# Patient Record
Sex: FEMALE | Race: OTHER
[De-identification: names, ages, dates, MRNs, and addresses within clinical notes are randomized per-mention and may not be internally consistent; named-entity substitution may affect disease eponyms.]

---

## 2018-07-30 PROBLEM — Z00.00 ENCOUNTER FOR PREVENTIVE HEALTH EXAMINATION: Status: ACTIVE | Noted: 2018-07-30

## 2018-08-09 ENCOUNTER — APPOINTMENT (OUTPATIENT)
Dept: OBGYN | Facility: CLINIC | Age: 43
End: 2018-08-09
Payer: COMMERCIAL

## 2018-08-09 PROCEDURE — 36415 COLL VENOUS BLD VENIPUNCTURE: CPT

## 2018-08-09 PROCEDURE — 99203 OFFICE O/P NEW LOW 30 MIN: CPT

## 2024-02-06 ENCOUNTER — APPOINTMENT (OUTPATIENT)
Dept: OBGYN | Facility: CLINIC | Age: 49
End: 2024-02-06
Payer: COMMERCIAL

## 2024-02-06 VITALS
SYSTOLIC BLOOD PRESSURE: 104 MMHG | HEART RATE: 82 BPM | BODY MASS INDEX: 28.32 KG/M2 | WEIGHT: 150 LBS | HEIGHT: 61 IN | DIASTOLIC BLOOD PRESSURE: 72 MMHG

## 2024-02-06 DIAGNOSIS — Z01.419 ENCOUNTER FOR GYNECOLOGICAL EXAMINATION (GENERAL) (ROUTINE) W/OUT ABNORMAL FINDINGS: ICD-10-CM

## 2024-02-06 PROCEDURE — 99386 PREV VISIT NEW AGE 40-64: CPT

## 2024-02-06 NOTE — END OF VISIT
[FreeTextEntry3] : I, Molly Darryl, acted as a scribe on behalf of Dr. Myra Mena D.O. on 02/06/2024.  All medical entries made by the scribe were at my, Dr. Myra Mena D.O, direction and personally dictated by me on 02/06/2024. I have reviewed the chart and agree that the record accurately reflects my personal performance of the history, physical exam, assessment and plan. I have also personally directed, reviewed, and agreed with the chart.

## 2024-02-06 NOTE — HISTORY OF PRESENT ILLNESS
[FreeTextEntry1] : 48 year old post menopausal female presents to establish care for an annual. C/o vaginal dryness and bloating.  OBHx:  x2 (, ) PSHx: cholecystectomy (Aug 1994)  Current Meds; Vit D, B12 NKDA  FamHx: DM - father / HTN - father / HLD - father / heart disease - father [Mammogramdate] : 02/23 [PapSmeardate] : 2023 [ColonoscopyDate] : 2019

## 2024-02-06 NOTE — PLAN
[FreeTextEntry1] : 48 year old female presents for an annual  -PAP done -Rx breast mammo -Rx pelvic sono -Colon to be scheduled -Discussed lubrication  RTO in 1 year

## 2024-02-07 LAB — HPV HIGH+LOW RISK DNA PNL CVX: NOT DETECTED

## 2024-02-09 LAB — CYTOLOGY CVX/VAG DOC THIN PREP: NORMAL
